# Patient Record
Sex: FEMALE | Race: AMERICAN INDIAN OR ALASKA NATIVE | NOT HISPANIC OR LATINO | URBAN - METROPOLITAN AREA
[De-identification: names, ages, dates, MRNs, and addresses within clinical notes are randomized per-mention and may not be internally consistent; named-entity substitution may affect disease eponyms.]

---

## 2017-04-06 ENCOUNTER — IMPORTED ENCOUNTER (OUTPATIENT)
Dept: URBAN - METROPOLITAN AREA CLINIC 31 | Facility: CLINIC | Age: 27
End: 2017-04-06

## 2017-04-06 PROCEDURE — 92004 COMPRE OPH EXAM NEW PT 1/>: CPT

## 2017-04-06 PROCEDURE — 92015 DETERMINE REFRACTIVE STATE: CPT

## 2017-04-06 NOTE — PATIENT DISCUSSION
1.  Rx for dist.  1st one. Pt wanted cls but does not need gls for workl.   2.  RTN 1 yr CE  [facility]

## 2018-04-13 ENCOUNTER — IMPORTED ENCOUNTER (OUTPATIENT)
Dept: URBAN - METROPOLITAN AREA CLINIC 31 | Facility: CLINIC | Age: 28
End: 2018-04-13

## 2018-04-13 PROCEDURE — 92015 DETERMINE REFRACTIVE STATE: CPT

## 2018-04-13 PROCEDURE — 92014 COMPRE OPH EXAM EST PT 1/>: CPT

## 2018-04-13 NOTE — PATIENT DISCUSSION
1.  Myope--  Has Rx for dist.   Pt wanted cls but does not need gls for workl. 2.  Schedule fitting of dailies. Moist -0.75-1.25x180 ou3.   RTN 1 yr CE  [facility]

## 2018-04-20 ENCOUNTER — IMPORTED ENCOUNTER (OUTPATIENT)
Dept: URBAN - METROPOLITAN AREA CLINIC 31 | Facility: CLINIC | Age: 28
End: 2018-04-20

## 2018-04-20 PROCEDURE — 92310 CONTACT LENS FITTING OU: CPT

## 2018-04-20 NOTE — PATIENT DISCUSSION
1.  Myope--  Has Rx for dist.   Pt wanted cls but does not need gls for workl. 2.  Fit with dailies. Moist -0.75-1.25x180/-0.75-0.75x180. Eval 100 with copay $25.  Teach I/R and care. Increase wt slowly. 3.  RTN 1 week clck4.   RTN 1 yr CE  [facility]

## 2019-09-26 ENCOUNTER — OFFICE (OUTPATIENT)
Dept: URBAN - METROPOLITAN AREA CLINIC 14 | Facility: CLINIC | Age: 29
End: 2019-09-26

## 2020-10-29 ENCOUNTER — OFFICE (OUTPATIENT)
Dept: URBAN - METROPOLITAN AREA CLINIC 14 | Facility: CLINIC | Age: 30
End: 2020-10-29

## 2020-10-29 VITALS
HEART RATE: 104 BPM | DIASTOLIC BLOOD PRESSURE: 78 MMHG | WEIGHT: 178 LBS | OXYGEN SATURATION: 100 % | SYSTOLIC BLOOD PRESSURE: 146 MMHG | HEIGHT: 67 IN

## 2020-10-29 DIAGNOSIS — R14.0 ABDOMINAL DISTENSION (GASEOUS): ICD-10-CM

## 2020-10-29 DIAGNOSIS — R10.30 LOWER ABDOMINAL PAIN, UNSPECIFIED: ICD-10-CM

## 2020-10-29 DIAGNOSIS — K21.9 GASTRO-ESOPHAGEAL REFLUX DISEASE WITHOUT ESOPHAGITIS: ICD-10-CM

## 2020-10-29 DIAGNOSIS — R14.2 ERUCTATION: ICD-10-CM

## 2020-10-29 LAB
H PYLORI BREATH TEST: NEGATIVE
H. PYLORI BREATH COLLECTION: (no result)

## 2020-10-29 PROCEDURE — 99204 OFFICE O/P NEW MOD 45 MIN: CPT | Performed by: INTERNAL MEDICINE

## 2020-10-29 RX ORDER — OMEPRAZOLE 40 MG/1
40 CAPSULE, DELAYED RELEASE ORAL
Qty: 30 | Refills: 11 | Status: ACTIVE
Start: 2020-10-29

## 2020-10-30 LAB
H PYLORI BREATH TEST: NEGATIVE
H. PYLORI BREATH COLLECTION: (no result)

## 2021-04-30 ENCOUNTER — OFFICE (OUTPATIENT)
Dept: URBAN - METROPOLITAN AREA PATHOLOGY 22 | Facility: PATHOLOGY | Age: 31
End: 2021-04-30

## 2021-04-30 ENCOUNTER — AMBULATORY SURGICAL CENTER (OUTPATIENT)
Dept: URBAN - METROPOLITAN AREA SURGERY 3 | Facility: SURGERY | Age: 31
End: 2021-04-30

## 2021-04-30 VITALS
DIASTOLIC BLOOD PRESSURE: 67 MMHG | HEIGHT: 67 IN | HEART RATE: 68 BPM | DIASTOLIC BLOOD PRESSURE: 67 MMHG | SYSTOLIC BLOOD PRESSURE: 108 MMHG | OXYGEN SATURATION: 100 % | WEIGHT: 160 LBS | HEART RATE: 71 BPM | TEMPERATURE: 97.8 F | DIASTOLIC BLOOD PRESSURE: 73 MMHG | RESPIRATION RATE: 20 BRPM | SYSTOLIC BLOOD PRESSURE: 108 MMHG | OXYGEN SATURATION: 100 % | RESPIRATION RATE: 21 BRPM | DIASTOLIC BLOOD PRESSURE: 53 MMHG | SYSTOLIC BLOOD PRESSURE: 89 MMHG | DIASTOLIC BLOOD PRESSURE: 77 MMHG | DIASTOLIC BLOOD PRESSURE: 67 MMHG | DIASTOLIC BLOOD PRESSURE: 53 MMHG | SYSTOLIC BLOOD PRESSURE: 111 MMHG | HEIGHT: 67 IN | RESPIRATION RATE: 21 BRPM | TEMPERATURE: 99 F | TEMPERATURE: 99 F | HEART RATE: 71 BPM | HEIGHT: 67 IN | OXYGEN SATURATION: 99 % | OXYGEN SATURATION: 99 % | SYSTOLIC BLOOD PRESSURE: 107 MMHG | OXYGEN SATURATION: 98 % | OXYGEN SATURATION: 99 % | SYSTOLIC BLOOD PRESSURE: 89 MMHG | RESPIRATION RATE: 20 BRPM | SYSTOLIC BLOOD PRESSURE: 103 MMHG | DIASTOLIC BLOOD PRESSURE: 69 MMHG | HEART RATE: 62 BPM | OXYGEN SATURATION: 98 % | DIASTOLIC BLOOD PRESSURE: 73 MMHG | HEART RATE: 71 BPM | WEIGHT: 160 LBS | DIASTOLIC BLOOD PRESSURE: 77 MMHG | DIASTOLIC BLOOD PRESSURE: 53 MMHG | SYSTOLIC BLOOD PRESSURE: 103 MMHG | HEART RATE: 68 BPM | SYSTOLIC BLOOD PRESSURE: 108 MMHG | TEMPERATURE: 99 F | WEIGHT: 160 LBS | DIASTOLIC BLOOD PRESSURE: 69 MMHG | RESPIRATION RATE: 20 BRPM | HEART RATE: 68 BPM | HEART RATE: 62 BPM | SYSTOLIC BLOOD PRESSURE: 89 MMHG | TEMPERATURE: 97.8 F | OXYGEN SATURATION: 98 % | DIASTOLIC BLOOD PRESSURE: 77 MMHG | OXYGEN SATURATION: 100 % | DIASTOLIC BLOOD PRESSURE: 73 MMHG | SYSTOLIC BLOOD PRESSURE: 107 MMHG | DIASTOLIC BLOOD PRESSURE: 69 MMHG | TEMPERATURE: 97.8 F | SYSTOLIC BLOOD PRESSURE: 107 MMHG | SYSTOLIC BLOOD PRESSURE: 103 MMHG | RESPIRATION RATE: 21 BRPM | SYSTOLIC BLOOD PRESSURE: 111 MMHG | HEART RATE: 62 BPM | SYSTOLIC BLOOD PRESSURE: 111 MMHG

## 2021-04-30 DIAGNOSIS — B37.81 CANDIDAL ESOPHAGITIS: ICD-10-CM

## 2021-04-30 DIAGNOSIS — K29.50 UNSPECIFIED CHRONIC GASTRITIS WITHOUT BLEEDING: ICD-10-CM

## 2021-04-30 DIAGNOSIS — K21.9 GASTRO-ESOPHAGEAL REFLUX DISEASE WITHOUT ESOPHAGITIS: ICD-10-CM

## 2021-04-30 PROBLEM — K22.8 OTHER SPECIFIED DISEASES OF ESOPHAGUS: Status: ACTIVE | Noted: 2021-04-30

## 2021-04-30 PROBLEM — K31.89 OTHER DISEASES OF STOMACH AND DUODENUM: Status: ACTIVE | Noted: 2021-04-30

## 2021-04-30 PROCEDURE — 43239 EGD BIOPSY SINGLE/MULTIPLE: CPT | Performed by: INTERNAL MEDICINE

## 2021-04-30 PROCEDURE — 88342 IMHCHEM/IMCYTCHM 1ST ANTB: CPT | Performed by: INTERNAL MEDICINE

## 2021-04-30 PROCEDURE — 88305 TISSUE EXAM BY PATHOLOGIST: CPT | Performed by: INTERNAL MEDICINE

## 2021-04-30 PROCEDURE — 88313 SPECIAL STAINS GROUP 2: CPT | Performed by: INTERNAL MEDICINE

## 2021-04-30 RX ORDER — FLUCONAZOLE 100 MG/1
TABLET ORAL
Qty: 15 | Refills: 0 | Status: ACTIVE
Start: 2021-04-30

## 2021-06-15 NOTE — PATIENT DISCUSSION
The patient feels that the cataract is significantly impacting daily activities and has elected cataract surgery. The risks, benefits, and alternatives to surgery were discussed. The patient elects to proceed with surgery. To schedule consult with Dr Shakir Sheridan.

## 2021-07-12 NOTE — PATIENT DISCUSSION
Recommend further testing and evaluation with retinal specialist. Recommend clearance prior to cataract surgery.

## 2021-07-12 NOTE — PATIENT DISCUSSION
Patient would like best distance vision; patient is a . Patient to consider Custom pkg for best corrected distance vision.

## 2021-07-28 NOTE — PATIENT DISCUSSION
Patient would like best distance vision; patient is a . Patient to consider Custom pkg for best corrected distance vision. Pt defers custom pckge, chooses BASIC + and he is aware he will need glasses for best corrected VA.

## 2021-12-09 NOTE — PATIENT DISCUSSION
Physical Therapy     Referred by: Katey Hale MD; Medical Diagnosis (from order):    Diagnosis Information      Diagnosis    724.5, 338.19 (ICD-9-CM) - M54.9 (ICD-10-CM) - Acute upper back pain    723.1, 338.29 (ICD-9-CM) - M54.2, G89.29 (ICD-10-CM) - Chronic neck pain              Initial Evaluation    Visit:  1   Treatment Diagnosis: cervical, right: shoulder symptoms with increased pain/symptoms, impaired posture, impaired range of motion, impaired muscle length/flexibility, impaired joint play/mobility, impaired strength  Date of onset: 6/9/2021  Chart reviewed at time of initial evaluation (relevant co-morbidities, allergies, tests and medications listed): Past Medical History:  No date: Bulging of cervical intervertebral disc  No date: Colon polyps  No date: Gastroesophageal reflux disease  No date: 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      SUBJECTIVE                                                                                                               Karolina presents with right sided neck pain. She reports she has a bulging disk. Neck symptoms are worse when looking down to cook or stir a pot or when taking care of her grandchildren. Pain improves with rest. She also has upper back pain on both sides, which is made worse with prolonged standing. Symptoms began 6 months ago insidiously. Denies numbness and tingling.   Function:   Limitations / Exacerbation Factors: pain, difficulty, increased time, Reason for Referral: Karolina Islas is a 64 year old year old female presenting with right sided neck and upper back pain. Symptoms and impairments limit patient's  The cataracts are responsible for the patient's decrease in vision and may consider cataract surgery to improve quality and clarity of vision. ability to cook for her family and take care of her grandchildren without pain or difficulty.  Prior Level of Function: declining function, therefore referred to therapy. pain free ADLs and IADLs,  Patient Goals: decreased pain and independence with ADLs/IADLs, Reduce pain when cooking    Prior treatment: no therapies  Discharged from hospital, home health, or skilled nursing facility in last 30 days: no    Home Environment:   Patient lives with children  Type of home: multiple level home  Assistance available: consistent  Feels safe at home/work/school.  2 or more falls or an unexplained fall with injury in the last year:  No    OBJECTIVE                                                                                                                   Hand Dominance: right-handed;     Observation:   Cervical: forward head  Shoulder: rounded    • Left Scapula: anteriorly tipped and protracted    • Right Scapula: anteriorly tipped and protracted  Spine: increased thoracic kyphosis  Range of Motion (ROM)   (degrees unless noted; active unless noted; norms in ( ); negative=lacking to 0, positive=beyond 0)   Cervical:    - Flexion (45-50): 47° pain    - Extension (45-60): 32°    - Rotation (60-80):        • Left: 75°        • Right: 65° pain    - Side Bend (45):        • Left: 35°        • Right: 30° pain    Strength  (out of 5 unless noted, standard test position unless noted, lbs tested with hand held dynamometer)   5/5: ALONSOE, TATIANA, except as noted  Shoulder:     - Elevation:         • Right (C4): pain, 4    - Abduction:        • Right (C5): pain, 4    - Internal Rotation:          • Right (C6/7): 4+    - External Rotation:         • Right (C6/7): pain and 4-    - Middle Trapezius Left: 4    - Middle Trapezius Right: pain, 4-      Palpation:   Right Upper Extremity: Levator Scapulae: hypertonic; Upper Trapezius: hypertonic;   Spine - Right: Sub Occipitals: hypertonic and tenderness; Cervical Paraspinals: hypertonic and  tenderness;   Comments / Details: Right rhomboids hypertonic, tender    Joint Play:   Cervical Spine:     - O-A:  Right: hypomobile  Comments/Details: OA left rotated       Outcome Measures:   Neck Disability Index (NDI): Neck Disability Index Score: 14  NDI Total Possible Score: 50  NDI Score Calculated: 28 %  (scored 0-100, higher score indicates higher disability) see flowsheet for additional documentation    Outcome Measures:   OSWESTRY Total Scored: 18  OSWESTRY Total Possible Score: 45  OSWESTRY Score Calculated: 40 %  (0-20% = minimal disability; 20-40% = moderate disability; 40-60% = severe disability; 60-80% = crippled; % = bed bound) see flowsheet for additional documentation    TREATMENT                                                                                                                initial evaluation completed    Therapeutic Exercise:  Patient education: evaluation findings, HEP completion    Patient completed one set of exercises in HEP section. See below.     Manual Therapy:  Manual scapular upward rotation mobilization  soft tissue mobilization: bilateral suboccipitals  Manually resisted right cervical side bend, rotation    Skilled input: verbal instruction/cues and tactile instruction/cues    Writer verbally educated and received verbal consent for hand placement, positioning of patient, and techniques to be performed today from patient for clothing adjustments for techniques and hand placement and palpation for techniques as described above and how they are pertinent to the patient's plan of care.    Home Exercise Program/Education Materials: *above indicates provided as part of home exercise program    Access Code: [email or web address]  URL: [email or web address]  Date: 12/09/2021  Prepared by: Ean Aaron    Exercises  Seated Isometric Cervical Rotation - 2 x daily - 7 x weekly - 10 reps - 3 seconds hold  Standing Isometric Cervical Sidebending with Manual  Resistance - 2 x daily - 7 x weekly - 10 reps - 3 seconds hold  Shoulder External Rotation and Scapular Retraction with Resistance - 2 x daily - 7 x weekly - 3 sets - 10 reps       ASSESSMENT                                                                                                           64 year old female patient has reported functional limitations listed above impacted by signs and symptoms consistent with below   • Involved: cervical      - right shoulder   • Symptoms/impairments: increased pain/symptoms, impaired posture, impaired range of motion, impaired muscle length/flexibility, impaired joint play/mobility and impaired strength    Karolina Islas is a 64 year old year old female presenting with right sided neck and upper back pain. Patient demonstrates impaired upper cervical posture. Right shoulder and scapular weakness evident with pain reproduced with resistance. Right first rib and OA hypomobile and tender. Right cervical musculature hypertonic. Symptoms and impairments limit patient's ability to cook for her family and take care of her grandchildren without pain or difficulty. NDI and Oswestry scores indicate decreased functional mobility. Failure to intervene may lead to further decline in function.     Prognosis: patient will benefit from skilled therapy  Rehabilitative potential is: excellent  Predicted patient presentation: Low (stable) - Patient comorbidities and complexities, as defined above, will have little effect on progress for prescribed plan of care.  Patient Education:   Who will be receiving education: patient  Are they ready to learn: yes  Preferred learning style: written, verbal and demonstration  Barriers to learning: language  Results of above outlined education: Verbalizes understanding and Demonstrates understanding    Therapist performed and billed unit(s) of today's treatment. and Assistant to modify based on patient progress and response to treatment.        PLAN                                                                                                                          The following skilled interventions to be implemented to achieve goals listed below:Manual Therapy (16818)  Neuromuscular Re-Education (58450)  Therapeutic Exercise (08705)  Frequency / Duration: 1 times per week tapering as patient progresses for an estimated total of 5 visits for 8 weeks    Patient involved in and agreed to plan of care and goals.  Patient given attendance policy at time of initial evaluation.  Suggestions for next session as indicated: Progress per plan of care      GOALS                                                                                                                           Impaired upper cervical posture  Impaired scapular posture  Impaired cervical AROM  Right shoulder weakness  The above improvements in impairments to assist in obtaining goals listed below  Long Term Goals: to be met by end of plan of care  1. Patient will demonstrate improved upper cervical posture for greater tolerance to completion of childcare tasks by end of plan of care.   2. Patient will demonstrate improved cervical tone for decreased difficulty cooking for family by end of plan of care.   3. Neck Disability Index: Patient will complete form to reflect an improved score to less than or equal to 7% to indicate patient reported improvement in function/disability/impairment (minimal detectable change: 21%) by end of plan of care.   4. Patient will be independent with progressed and modified home exercise program by end of plan of care.       Therapy procedure time and total treatment time can be found documented on the Time Entry flowsheet

## 2022-03-08 NOTE — PATIENT DISCUSSION
Continue  AREDS 2 supplements, UV protection and green leafy vegetables. and [address] [facility].

## 2022-03-29 NOTE — PATIENT DISCUSSION
Lab INR results received today: INR is 2.4. Spoke with patient via phone for follow up anticoagulation visit. Last INR on 3/1 was 3.0. Dose maintained. Today's INR is 2.4 and is within goal range. Current warfarin total weekly dose of 25 mg verified. Informed the INR result is within therapeutic range and instructed to maintain current dose per protocol. Discussed dose and return date of 4 weeks for next INR. See Anticoagulation flowsheet. Dr. Garland Loomis is in the office today supervising the treatment. Edi Holliday MD is referring provider. Instructed to contact the clinic with any unusual bleeding or bruising, any changes in medications, diet, health status, lifestyle, or any other changes, questions or concerns. Verbalized understanding of all discussed. Proceed with EYE OS, IOL TYPE MONOFOCAL, POST OPERATIVE TARGET PL/-0.50, PACKAGE BASIC +.

## 2022-04-01 ASSESSMENT — VISUAL ACUITY
OS_CC: 20/70
OS_SC: 20/25
OD_CC: 20/60-2
OS_CC: 20/50
OS_CC: 20/80-1
OD_CC: 20/50-2
OD_CC: 20/50
OD_SC: 20/25

## 2022-04-01 ASSESSMENT — TONOMETRY
OS_IOP_MMHG: 14
OD_IOP_MMHG: 14
OS_IOP_MMHG: 14
OD_IOP_MMHG: 14

## 2022-07-09 ENCOUNTER — TELEPHONE ENCOUNTER (OUTPATIENT)
Dept: URBAN - METROPOLITAN AREA CLINIC 121 | Facility: CLINIC | Age: 32
End: 2022-07-09

## 2022-07-09 RX ORDER — CALCIUM CARBONATE 300MG(750)
TABLET,CHEWABLE ORAL ONCE A DAY
Refills: 0 | OUTPATIENT
Start: 2018-04-02 | End: 2018-04-12

## 2022-07-09 RX ORDER — PANTOPRAZOLE SODIUM 40 MG/1
TABLET, DELAYED RELEASE ORAL ONCE A DAY
Refills: 0 | OUTPATIENT
Start: 2018-04-12 | End: 2018-04-12

## 2022-07-09 RX ORDER — SUCRALFATE 1 G/1
TABLET ORAL TWICE A DAY
Refills: 0 | OUTPATIENT
Start: 2018-03-29 | End: 2018-04-12

## 2022-07-09 RX ORDER — PANTOPRAZOLE SODIUM 40 MG/1
TABLET, DELAYED RELEASE ORAL ONCE A DAY
Refills: 0 | OUTPATIENT
Start: 2018-03-24 | End: 2018-04-12

## 2022-07-09 RX ORDER — SUCRALFATE 1 G/1
TABLET ORAL TWICE A DAY
Refills: 0 | OUTPATIENT
Start: 2018-04-12 | End: 2018-04-12

## 2022-07-10 ENCOUNTER — TELEPHONE ENCOUNTER (OUTPATIENT)
Dept: URBAN - METROPOLITAN AREA CLINIC 121 | Facility: CLINIC | Age: 32
End: 2022-07-10

## 2022-07-10 RX ORDER — CALCIUM CARBONATE 300MG(750)
TABLET,CHEWABLE ORAL ONCE A DAY
Refills: 0 | Status: ACTIVE | COMMUNITY
Start: 2018-04-12
